# Patient Record
Sex: FEMALE | Race: BLACK OR AFRICAN AMERICAN | NOT HISPANIC OR LATINO | URBAN - METROPOLITAN AREA
[De-identification: names, ages, dates, MRNs, and addresses within clinical notes are randomized per-mention and may not be internally consistent; named-entity substitution may affect disease eponyms.]

---

## 2023-01-12 ENCOUNTER — EMERGENCY (EMERGENCY)
Facility: HOSPITAL | Age: 23
LOS: 1 days | Discharge: LEFT BEFORE DISCHARGE | End: 2023-01-12
Attending: EMERGENCY MEDICINE | Admitting: EMERGENCY MEDICINE
Payer: SELF-PAY

## 2023-01-12 VITALS
TEMPERATURE: 98 F | SYSTOLIC BLOOD PRESSURE: 159 MMHG | HEART RATE: 74 BPM | DIASTOLIC BLOOD PRESSURE: 94 MMHG | RESPIRATION RATE: 16 BRPM | OXYGEN SATURATION: 96 %

## 2023-01-12 PROCEDURE — 99284 EMERGENCY DEPT VISIT MOD MDM: CPT

## 2023-01-12 RX ORDER — IBUPROFEN 200 MG
600 TABLET ORAL ONCE
Refills: 0 | Status: COMPLETED | OUTPATIENT
Start: 2023-01-12 | End: 2023-01-12

## 2023-01-12 RX ADMIN — Medication 1 TABLET(S): at 21:38

## 2023-01-12 RX ADMIN — Medication 600 MILLIGRAM(S): at 21:38

## 2023-01-12 NOTE — ED PROVIDER NOTE - OBJECTIVE STATEMENT
21 y/o F w/no sig pmhx p/w L lower rear molar pain from known cracked tooth. Pain getting progressively worse over past several hours. Denies new trauma or biting down on a hard object. No facial swelling. No fever/chills. Pain radiates toward ear and down her neck.

## 2023-01-12 NOTE — ED PROVIDER NOTE - PHYSICAL EXAMINATION
Addended by: URSULA LUNA on: 6/23/2021 12:32 PM     Modules accepted: Orders     CONSTITUTIONAL: Well appearing, well nourished, awake, alert, oriented to person, place, time/situation and in no apparent distress.  ENT: Airway patent, Nasal mucosa clear. + cracked and severely degraded L rear lower molar w/gingival edema and + TTP, no fluctuance, no buccal edema, no lifting of the floor of the mouth  EYES: Clear bilaterally.  RESPIRATORY: Breathing comfortably with normal RR.  MSK: Range of motion is not limited, no deformities noted.  NEURO: Alert and oriented, no focal deficits.  SKIN: Skin normal color for race, warm, dry and intact. No evidence of rash.  PSYCH: Alert and oriented to person, place, time/situation. normal mood and affect. no apparent risk to self or others.

## 2023-01-12 NOTE — ED PROVIDER NOTE - CLINICAL SUMMARY MEDICAL DECISION MAKING FREE TEXT BOX
cracked tooth with early signs of infectious process, treating with pain control and augmetin. d/w pt the need to f/u with dentist for extraction or this problem will recur. cracked tooth with early signs of infectious process, treating with pain control and augmetin. d/w pt the need to f/u with dentist for extraction or this problem will recur, but pt eloped prior to discharge.

## 2023-01-12 NOTE — ED ADULT NURSE NOTE - SUICIDE SCREENING QUESTION 2
Patient unable to complete Additional Notes: Eforcse verified\\n**pt states waiting on tramadol/antibiotics for a couple days due to him not having a pharmacy in [place]. Additional Notes: Eforcse verified\\n**pt states waiting on tramadol/antibiotics for a couple days due to him not having a pharmacy in [place].

## 2023-01-12 NOTE — ED ADULT NURSE NOTE - OBJECTIVE STATEMENT
Pt presents to ED c/o severe left ear and neck pain. Pt met in chair appeared agitated, yelling about needing pain meds. Pt already seen by MD and unwilling to provide this writer with details regarding chief complaint/history. Plan of care explained; amoxicillin, ibuprofen, and percocet administered per MD order.

## 2023-01-12 NOTE — ED PROVIDER NOTE - NSFOLLOWUPINSTRUCTIONS_ED_ALL_ED_FT
DISCHARGE INSTRUCTIONS:    Return to the emergency department if:   •You have severe pain in your tooth or jaw.      •You have trouble breathing because of pain or swelling.      Call your doctor if:   •Your symptoms get worse, even after treatment.      •Your mouth is bleeding.      •You cannot eat or drink because of pain or swelling.      •Your abscess returns.      •You have an injury that causes a crack in your tooth.      •You have questions or concerns about your condition or care.      Medicines: You may need any of the following:   •Antibiotics help treat a bacterial infection.       •NSAIDs, such as ibuprofen, help decrease swelling, pain, and fever. This medicine is available with or without a doctor's order. NSAIDs can cause stomach bleeding or kidney problems in certain people. If you take blood thinner medicine, always ask your healthcare provider if NSAIDs are safe for you. Always read the medicine label and follow directions.      •Acetaminophen decreases pain and fever. It is available without a doctor's order. Ask how much to take and how often to take it. Follow directions. Read the labels of all other medicines you are using to see if they also contain acetaminophen, or ask your doctor or pharmacist. Acetaminophen can cause liver damage if not taken correctly.      •Prescription pain medicine may be given. Ask your healthcare provider how to take this medicine safely. Some prescription pain medicines contain acetaminophen. Do not take other medicines that contain acetaminophen without talking to your healthcare provider. Too much acetaminophen may cause liver damage. Prescription pain medicine may cause constipation. Ask your healthcare provider how to prevent or treat constipation.       •Take your medicine as directed. Contact your healthcare provider if you think your medicine is not helping or if you have side effects. Tell your provider if you are allergic to any medicine. Keep a list of the medicines, vitamins, and herbs you take. Include the amounts, and when and why you take them. Bring the list or the pill bottles to follow-up visits. Carry your medicine list with you in case of an emergency.      Self-care:   •Rinse your mouth every 2 hours with salt water. This will help keep the area clean.       •Gently brush your teeth twice a day with a soft tooth brush. This will help keep the area clean.       •Eat soft foods as directed. Soft foods may cause less pain. Examples include applesauce, yogurt, and cooked pasta. Ask your healthcare provider how long to follow this instruction.       •Apply a warm compress to your tooth or gum. Use a cotton ball or gauze soaked in warm water. Remove the compress in 10 minutes or when it becomes cool. Repeat 3 times a day.       Prevent another abscess:   •Brush your teeth at least 2 times a day with fluoride toothpaste.      •Use dental floss at least once a day to clean between your teeth.      •Rinse your mouth with water or mouthwash after meals and snacks. Chew sugarless gum.      •Avoid sugary and starchy food that can stick between your teeth. Limit drinks high in sugar, such as soda or fruit juice.      •See your dentist every 6 months for dental cleanings and oral exams.

## 2023-01-12 NOTE — ED ADULT NURSE NOTE - NSIMPLEMENTINTERV_GEN_ALL_ED
Implemented All Fall Risk Interventions:  [place] to call system. Call bell, personal items and telephone within reach. Instruct patient to call for assistance. Room bathroom lighting operational. Non-slip footwear when patient is off stretcher. Physically safe environment: no spills, clutter or unnecessary equipment. Stretcher in lowest position, wheels locked, appropriate side rails in place. Provide visual cue, wrist band, yellow gown, etc. Monitor gait and stability. Monitor for mental status changes and reorient to person, place, and time. Review medications for side effects contributing to fall risk. Reinforce activity limits and safety measures with patient and family.

## 2023-01-16 DIAGNOSIS — K03.81 CRACKED TOOTH: ICD-10-CM

## 2023-01-16 DIAGNOSIS — H92.02 OTALGIA, LEFT EAR: ICD-10-CM

## 2023-01-16 DIAGNOSIS — Z53.29 PROCEDURE AND TREATMENT NOT CARRIED OUT BECAUSE OF PATIENT'S DECISION FOR OTHER REASONS: ICD-10-CM
